# Patient Record
Sex: FEMALE | Race: WHITE | NOT HISPANIC OR LATINO | Employment: FULL TIME | URBAN - METROPOLITAN AREA
[De-identification: names, ages, dates, MRNs, and addresses within clinical notes are randomized per-mention and may not be internally consistent; named-entity substitution may affect disease eponyms.]

---

## 2024-03-12 ENCOUNTER — OFFICE VISIT (OUTPATIENT)
Dept: URGENT CARE | Facility: URGENT CARE | Age: 24
End: 2024-03-12
Payer: COMMERCIAL

## 2024-03-12 ENCOUNTER — NURSE TRIAGE (OUTPATIENT)
Dept: FAMILY MEDICINE | Facility: CLINIC | Age: 24
End: 2024-03-12
Payer: COMMERCIAL

## 2024-03-12 VITALS
RESPIRATION RATE: 16 BRPM | OXYGEN SATURATION: 100 % | DIASTOLIC BLOOD PRESSURE: 85 MMHG | WEIGHT: 152.4 LBS | SYSTOLIC BLOOD PRESSURE: 135 MMHG | TEMPERATURE: 98.5 F | HEART RATE: 80 BPM

## 2024-03-12 DIAGNOSIS — R10.32 ABDOMINAL PAIN, LEFT LOWER QUADRANT: Primary | ICD-10-CM

## 2024-03-12 DIAGNOSIS — K59.00 CONSTIPATION, UNSPECIFIED CONSTIPATION TYPE: ICD-10-CM

## 2024-03-12 PROCEDURE — 99203 OFFICE O/P NEW LOW 30 MIN: CPT | Performed by: STUDENT IN AN ORGANIZED HEALTH CARE EDUCATION/TRAINING PROGRAM

## 2024-03-12 NOTE — TELEPHONE ENCOUNTER
Patient reports that she has been having abdominal pain since Saturday. Saturday she had excruciating abdominal pain with cramping in rectum and vaginal area. She also had bloating. Sunday she had abdominal pain with bloating, 6/10- dull all day not pressing on it, pressing made it worse. The pain is located in her lower right quadrant of her abdomin. She reports that it hurts worse to press it. She has all her organ.  She has had the IUD 11/2020.  She can feeling her stings and does a monthly check.  She is sexually active and uses condoms on and off. [place] was painful on Saturday and Sunday. This pain was in her pelvis.    Today she does not have the constant pain, but pressing on it causes 4/10 pain  Monday the pain was constant dull pain 3/10, but she could still work.   Saturday and Sunday the pain was sharp.   The bounce back after pressing on it hurts more than the pressing. Heal plant dos not make it hurt.   Denies: Fever, nausea/vomiting, dysuria    Nursing advice: Due to the location of the pain and the behavior of the pain she is to be assessed in the E.R. now.  She was advised that I will be canceling her appointment on 3/20/2024. She agrees with this action. Patient was given signs and symptoms to call 911.  Patient verbalizes good understanding, agrees with plan and states she needs no further support. Judith Farrell R.N.    Reason for Disposition   Patient sounds very sick or weak to the triager    Additional Information   Negative: Passed out (i.e., fainted, collapsed and was not responding)   Negative: Shock suspected (e.g., cold/pale/clammy skin, too weak to stand, low BP, rapid pulse)   Negative: Sounds like a life-threatening emergency to the triager   Negative: SEVERE abdominal pain (e.g., excruciating)   Negative: Vomiting red blood or black (coffee ground) material   Negative: Blood in bowel movements  (Exception: Blood on surface of BM with constipation.)   Negative: Black or tarry bowel  movements  (Exception: Chronic-unchanged black-grey BMs AND is taking iron pills or Pepto-Bismol.)   Negative: Vomiting and abdomen looks much more swollen than usual   Negative: Vomiting bile (green color)   Negative: MILD TO MODERATE constant pain lasting > 2 hours    Protocols used: Abdominal Pain - Female-A-[place]

## 2024-03-12 NOTE — PATIENT INSTRUCTIONS
Try Tylenol and ibuprofen for pain  Drinking lots of fluids   Consider a redose the laxative to see if that helps    Follow up in 1 week with provider for reevaluation.

## 2024-03-12 NOTE — PROGRESS NOTES
Urgent Care - 03/12/2024      HPI:      Elio Good is a 23 year old female here for evaluation of abdominal pain.    4 days of abdominal pain. Started as abdominal cramping, almost like period cramping. Developed some bloating on Sunday. Sunday and Monday it was painful all the time, now only if you press on it.     On Saturday and Sunday, had pelvic pain with intercourse. Took a pregnancy test on Sunday and it was negative. Has the same partner, no STI concerns. No vaginal burning, discharge, irritation. No pain with urination.     No vomiting, fevers, no diarrhea. Thought she was constipated, so she took a laxative, which was successful.  Eating and drinking well today.    No cough, runny nose, cold symptoms.     Review of Systems  Complete ROS negative unless noted in the HPI above.     Allergies  No Known Allergies    Outpatient Medications  No current outpatient medications on file prior to visit.  No current facility-administered medications on file prior to visit.      Past Medical History  There is no problem list on file for this patient.      Family History  History reviewed. No pertinent family history.    Social History  Social History     Tobacco Use    Smoking status: Never    Smokeless tobacco: Never   Substance Use Topics    Alcohol use: Not on file        Objective:      /85   Pulse 80   Temp 98.5  F (36.9  C) (Oral)   Resp 16   Wt 69.1 kg (152 lb 6.4 oz)   SpO2 100%     General: well-appearing, in no acute distress.  HEENT: NC/AT, MMM  CVS: RRR. No murmurs, rubs, or gallops.  Resp: CTAB, no wheezes or crackles   Abd: Normal active bowel sounds. Soft/non-distended, tender to palpation, mostly in suprapubic area and left lower quadrant. No rebound or guarding.    Extremities: no peripheral edema bilaterally   Skin: no rash on exposed skin     Lab & Imaging Results    No results found for this or any previous visit (from the past 24 hour(s)).    I personally reviewed these results and  discussed findings with the patient.      Assessment & Plan:   Elio Good is a 23 year old yo female, who presented with 4 days of abdominal pain.    Abdominal pain, left lower quadrant  Constipation, unspecified constipation type  Patient presented with 4 days of abdominal pain, which is slowly improving.  Most suspicious for constipation as etiology, since pain has started to be relieved since taking laxative.  No fevers or other infectious symptoms to suggest an infectious etiology.  No pain in the right lower quadrant to suggest appendicitis.  Lower concern for something like ovarian torsion without continued episodes of pain.  Also considered ovarian cyst, which could have caused her pain, but since it is improving would not recommend any further imaging at this time.  She took a pregnancy test at home which was negative, therefore unlikely to be this.  Considered migration of IUD, but she has not had any vaginal bleeding, therefore I think this is less likely.  Given that she is already starting to improve, and vital signs are normal, recommended she continue monitoring this at home.  She is to drink lots of fluids and take Tylenol and ibuprofen as needed for pain.  Advised her to present to the ER if she is having any increasing abdominal pain, fevers, vomiting and diarrhea, new or painful vaginal bleeding, or any other symptoms that concern her and she would like evaluated.  She expressed understanding of this.  Recommended she follow-up with her primary provider in about 1 week to discuss her pain and determine if any other workup is necessary, such as a abdominal ultrasound, to evaluate for issues with her IUD/ovarian cyst.  -     PRIMARY CARE FOLLOW-UP SCHEDULING; Future    Discussed the above with the patient, who stated understanding and agreed with the plan.     Danette Perkins MD  Internal Medicine and Pediatrics   Urgent Care

## 2024-05-19 ENCOUNTER — HEALTH MAINTENANCE LETTER (OUTPATIENT)
Age: 24
End: 2024-05-19

## 2025-06-08 ENCOUNTER — HEALTH MAINTENANCE LETTER (OUTPATIENT)
Age: 25
End: 2025-06-08